# Patient Record
Sex: MALE | Race: ASIAN | NOT HISPANIC OR LATINO | Employment: UNEMPLOYED | URBAN - METROPOLITAN AREA
[De-identification: names, ages, dates, MRNs, and addresses within clinical notes are randomized per-mention and may not be internally consistent; named-entity substitution may affect disease eponyms.]

---

## 2020-03-28 ENCOUNTER — HOSPITAL ENCOUNTER (EMERGENCY)
Facility: HOSPITAL | Age: 55
Discharge: HOME | End: 2020-03-28
Attending: EMERGENCY MEDICINE
Payer: MEDICAID

## 2020-03-28 VITALS
WEIGHT: 140 LBS | HEART RATE: 68 BPM | DIASTOLIC BLOOD PRESSURE: 78 MMHG | TEMPERATURE: 99 F | BODY MASS INDEX: 22.5 KG/M2 | SYSTOLIC BLOOD PRESSURE: 120 MMHG | OXYGEN SATURATION: 98 % | HEIGHT: 66 IN | RESPIRATION RATE: 16 BRPM

## 2020-03-28 DIAGNOSIS — K59.00 CONSTIPATION: Primary | ICD-10-CM

## 2020-03-28 PROCEDURE — 99284 EMERGENCY DEPT VISIT MOD MDM: CPT | Mod: 25,ER

## 2020-03-28 RX ORDER — FAMOTIDINE 20 MG/1
20 TABLET, FILM COATED ORAL 2 TIMES DAILY
Qty: 60 TABLET | Refills: 0 | Status: SHIPPED | OUTPATIENT
Start: 2020-03-28 | End: 2021-03-28

## 2020-03-28 RX ORDER — SIMETHICONE 125 MG
125 CAPSULE ORAL 4 TIMES DAILY PRN
Qty: 30 CAPSULE | Refills: 0 | Status: SHIPPED | OUTPATIENT
Start: 2020-03-28

## 2020-03-28 RX ORDER — DICYCLOMINE HYDROCHLORIDE 20 MG/1
20 TABLET ORAL 2 TIMES DAILY
Qty: 20 TABLET | Refills: 0 | Status: SHIPPED | OUTPATIENT
Start: 2020-03-28 | End: 2020-04-27

## 2020-03-28 NOTE — ED PROVIDER NOTES
Encounter Date: 3/28/2020       History     Chief Complaint   Patient presents with    abdominal discomfort     pt presents to ED with c/o diffuse abd fullness that started x1 1/2 weeks ago, also decrease in appetite and weakness. Denies any coughing, fever, n/v/d, sob or previous testing for covid at this time     54 y.o. Male Israeli speaking, son translating, here for evaluation of  abd fullness. Notes he has over the last week felt bloated, took miralax recently which possibly helped but not sure. Denies f/c, n/v, diarrhea/dysuria or other complaints.        Review of patient's allergies indicates:  No Known Allergies  No past medical history on file.  No past surgical history on file.  Family History   Problem Relation Age of Onset    Colon cancer Brother      Social History     Tobacco Use    Smoking status: Smoker, Current Status Unknown     Packs/day: 1.00     Types: Cigarettes    Smokeless tobacco: Never Used   Substance Use Topics    Alcohol use: No     Frequency: Never    Drug use: No     Review of Systems   Constitutional: Negative for fever.   HENT: Negative for sore throat.    Respiratory: Negative for shortness of breath.    Cardiovascular: Negative for chest pain.   Gastrointestinal: Negative for abdominal distention, abdominal pain, nausea and vomiting.   Genitourinary: Negative for dysuria.   Musculoskeletal: Negative for back pain.   Skin: Negative for rash.   Neurological: Negative for weakness.   Hematological: Does not bruise/bleed easily.   All other systems reviewed and are negative.      Physical Exam     Initial Vitals [03/28/20 1746]   BP Pulse Resp Temp SpO2   120/78 68 16 98.6 °F (37 °C) 98 %      MAP       --         Physical Exam    Nursing note and vitals reviewed.  Constitutional: He appears well-developed and well-nourished.   HENT:   Head: Normocephalic and atraumatic.   Eyes: EOM are normal. Pupils are equal, round, and reactive to light.   Cardiovascular: Normal rate,  regular rhythm and normal heart sounds.   Pulmonary/Chest: Effort normal and breath sounds normal. No respiratory distress. He has no wheezes. He has no rales.   Abdominal: He exhibits no distension.   Musculoskeletal: He exhibits no edema or tenderness.   Neurological: He is alert and oriented to person, place, and time.   Skin: Skin is warm and dry.   Psychiatric: He has a normal mood and affect.     soft ntnd no g/r  No focal ttp,  ED Course   Procedures  Labs Reviewed - No data to display       Imaging Results    None                                  I have ordered xray. Will plan to start on simethicone, bentyl, pepcid  Will also refer to gi.      Clinical Impression:       ICD-10-CM ICD-9-CM   1. Constipation K59.00 564.00                                Fletcher Vigil MD  03/28/20 1695

## 2020-03-28 NOTE — DISCHARGE INSTRUCTIONS
Thank you for coming to our Emergency Department today. It is important to remember that some problems are difficult to diagnose and may not be found during your first visit. Be sure to follow up with your primary care doctor and review any labs/imaging that was performed with them. If you do not have a primary care doctor, you may contact the one listed on your discharge paperwork or you may also call the [facility] Appointment Desk at [phone] to schedule an appointment with one.     All medications may potentially have side effects and it is impossible to predict which medications may give you side effects. If you feel that you are having a negative effect of any medication you should immediately stop taking them and seek medical attention.    Return to the ER with any questions/concerns, new/concerning symptoms, worsening or failure to improve. Do not drive or make any important decisions for 24 hours if you have received any pain medications, sedatives or mood altering drugs during your ER visit.

## 2020-03-30 ENCOUNTER — HOSPITAL ENCOUNTER (EMERGENCY)
Facility: HOSPITAL | Age: 55
Discharge: HOME | End: 2020-03-30
Attending: EMERGENCY MEDICINE
Payer: MEDICAID

## 2020-03-30 VITALS
HEART RATE: 59 BPM | HEIGHT: 66 IN | OXYGEN SATURATION: 100 % | WEIGHT: 140 LBS | BODY MASS INDEX: 22.5 KG/M2 | DIASTOLIC BLOOD PRESSURE: 65 MMHG | SYSTOLIC BLOOD PRESSURE: 112 MMHG | TEMPERATURE: 98 F | RESPIRATION RATE: 19 BRPM

## 2020-03-30 DIAGNOSIS — E87.6 HYPOKALEMIA: ICD-10-CM

## 2020-03-30 DIAGNOSIS — R06.02 SOB (SHORTNESS OF BREATH): ICD-10-CM

## 2020-03-30 DIAGNOSIS — R10.84 GENERALIZED ABDOMINAL PAIN: Primary | ICD-10-CM

## 2020-03-30 DIAGNOSIS — R07.89 CHEST TIGHTNESS: ICD-10-CM

## 2020-03-30 LAB
ALBUMIN SERPL-MCNC: 3.6 G/DL (ref 3.3–5.5)
ALLENS TEST: ABNORMAL
ALP SERPL-CCNC: 50 U/L (ref 42–141)
BILIRUB SERPL-MCNC: 0.9 MG/DL (ref 0.2–1.6)
BUN SERPL-MCNC: 9 MG/DL (ref 7–22)
CALCIUM SERPL-MCNC: 9.2 MG/DL (ref 8–10.3)
CHLORIDE SERPL-SCNC: 107 MMOL/L (ref 98–108)
CREAT SERPL-MCNC: 0.9 MG/DL (ref 0.6–1.2)
GLUCOSE SERPL-MCNC: 140 MG/DL (ref 73–118)
HCO3 UR-SCNC: 25.1 MMOL/L (ref 24–28)
LDH SERPL L TO P-CCNC: 1.34 MMOL/L (ref 0.5–2.2)
PCO2 BLDA: 39.5 MMHG (ref 35–45)
PH SMN: 7.41 [PH] (ref 7.35–7.45)
PO2 BLDA: 62 MMHG (ref 40–60)
POC ALT (SGPT): 25 U/L (ref 10–47)
POC AST (SGOT): 25 U/L (ref 11–38)
POC B-TYPE NATRIURETIC PEPTIDE: 7.5 PG/ML (ref 0–100)
POC BE: 0 MMOL/L
POC CARDIAC TROPONIN I: 0 NG/ML
POC SATURATED O2: 92 % (ref 95–100)
POC TCO2: 26 MMOL/L (ref 24–29)
POC TCO2: 27 MMOL/L (ref 18–33)
POTASSIUM BLD-SCNC: 3.4 MMOL/L (ref 3.6–5.1)
PROTEIN, POC: 6.6 G/DL (ref 6.4–8.1)
SAMPLE: ABNORMAL
SAMPLE: NORMAL
SITE: ABNORMAL
SODIUM BLD-SCNC: 141 MMOL/L (ref 128–145)

## 2020-03-30 PROCEDURE — 25000003 PHARM REV CODE 250: Mod: ER | Performed by: EMERGENCY MEDICINE

## 2020-03-30 PROCEDURE — 81003 URINALYSIS AUTO W/O SCOPE: CPT | Mod: ER

## 2020-03-30 PROCEDURE — 85025 COMPLETE CBC W/AUTO DIFF WBC: CPT | Mod: ER

## 2020-03-30 PROCEDURE — 93005 ELECTROCARDIOGRAM TRACING: CPT | Mod: ER

## 2020-03-30 PROCEDURE — 96360 HYDRATION IV INFUSION INIT: CPT | Mod: ER

## 2020-03-30 PROCEDURE — 83880 ASSAY OF NATRIURETIC PEPTIDE: CPT | Mod: ER

## 2020-03-30 PROCEDURE — 93010 EKG 12-LEAD: ICD-10-PCS | Mod: ,,, | Performed by: INTERNAL MEDICINE

## 2020-03-30 PROCEDURE — 82803 BLOOD GASES ANY COMBINATION: CPT | Mod: ER

## 2020-03-30 PROCEDURE — 63600175 PHARM REV CODE 636 W HCPCS: Mod: ER | Performed by: EMERGENCY MEDICINE

## 2020-03-30 PROCEDURE — 93010 ELECTROCARDIOGRAM REPORT: CPT | Mod: ,,, | Performed by: INTERNAL MEDICINE

## 2020-03-30 PROCEDURE — 99285 EMERGENCY DEPT VISIT HI MDM: CPT | Mod: 25,ER

## 2020-03-30 PROCEDURE — 80053 COMPREHEN METABOLIC PANEL: CPT | Mod: ER

## 2020-03-30 PROCEDURE — 84484 ASSAY OF TROPONIN QUANT: CPT | Mod: ER

## 2020-03-30 PROCEDURE — 25500020 PHARM REV CODE 255: Mod: ER | Performed by: EMERGENCY MEDICINE

## 2020-03-30 RX ORDER — POTASSIUM CHLORIDE 20 MEQ/1
40 TABLET, EXTENDED RELEASE ORAL
Status: COMPLETED | OUTPATIENT
Start: 2020-03-30 | End: 2020-03-30

## 2020-03-30 RX ORDER — HYDROXYZINE PAMOATE 50 MG/1
50 CAPSULE ORAL NIGHTLY PRN
Qty: 15 CAPSULE | Refills: 0 | Status: SHIPPED | OUTPATIENT
Start: 2020-03-30

## 2020-03-30 RX ADMIN — SODIUM CHLORIDE 1000 ML: 0.9 INJECTION, SOLUTION INTRAVENOUS at 01:03

## 2020-03-30 RX ADMIN — IOHEXOL 75 ML: 350 INJECTION, SOLUTION INTRAVENOUS at 01:03

## 2020-03-30 RX ADMIN — LIDOCAINE HYDROCHLORIDE: 20 SOLUTION ORAL; TOPICAL at 02:03

## 2020-03-30 RX ADMIN — POTASSIUM CHLORIDE 40 MEQ: 1500 TABLET, EXTENDED RELEASE ORAL at 02:03

## 2020-03-30 NOTE — ED PROVIDER NOTES
"Encounter Date: 3/30/2020       History     Chief Complaint   Patient presents with    Chest Tightness     c/o chest tightness in the middle of chest since earlier today with intermittent SOB. denies n/v/d, dizziness or any other symptoms     54 y.o. Maltese male with tobacco abuse presents to ED with his son () who states patient presents to ED with acute chest tightness, chills, SOB and facial numbness that began yesterday around 5-6 AM while patient was asleep.  Son states patient was in ED yesterday for abdominal pain and was prescribed bentyl, pepcid and medication for constipation. Son reports giving the patient Pepcid and Bentyl but states he did not give additional medication for constipation because he had already tried that 3 days prior without improvement.  He states patient ate dinner last night and vomited once while he was eating.  Son reports patient with a scant bowel movement yesterday. Denies BRBPR or melena. Reports 5 lb weigh loss this week.     Patient also reports productive cough with white sputum in the mornings upon first waking x 1 week.     Reports patient's brother  of colon cancer in his 40's. Patient reports last c-scope 4 years ago had polyps removed overdue for follow up scope after 3 years.     Son also states patient has had fatigue, decreased appetite, and cold chills that start in both feet and radiates throughout the body and described as feeling as though "my body is scared" but denies feeling anxious.    The history is provided by a relative and the patient.     Review of patient's allergies indicates:  No Known Allergies  History reviewed. No pertinent past medical history.  History reviewed. No pertinent surgical history.  Family History   Problem Relation Age of Onset    Colon cancer Brother      Social History     Tobacco Use    Smoking status: Smoker, Current Status Unknown     Packs/day: 1.00     Types: Cigarettes    Smokeless tobacco: Never Used "   Substance Use Topics    Alcohol use: No     Frequency: Never    Drug use: No     Review of Systems   Constitutional: Positive for appetite change (decreased) and chills. Negative for fever.   Respiratory: Positive for shortness of breath. Negative for cough.    Cardiovascular: Positive for chest pain.   Gastrointestinal: Positive for abdominal pain and constipation. Negative for nausea and vomiting.   Genitourinary: Negative for decreased urine volume, dysuria and hematuria.   Musculoskeletal: Negative for arthralgias, back pain, gait problem and myalgias.   Psychiatric/Behavioral: Positive for sleep disturbance.   All other systems reviewed and are negative.      Physical Exam     Initial Vitals [03/30/20 0014]   BP Pulse Resp Temp SpO2   117/78 65 17 98.2 °F (36.8 °C) 99 %      MAP       --         Physical Exam    Nursing note and vitals reviewed.  Constitutional: He appears well-developed and well-nourished. He is not diaphoretic. No distress.   HENT:   Head: Normocephalic and atraumatic.   Mouth/Throat: Uvula is midline and oropharynx is clear and moist. Mucous membranes are dry.   Eyes: Conjunctivae and EOM are normal.   Neck: Normal range of motion and phonation normal. Neck supple. No stridor present.   Cardiovascular: Regular rhythm and intact distal pulses.   Pulmonary/Chest: No accessory muscle usage. No tachypnea. No respiratory distress.   Abdominal: He exhibits no distension. There is no tenderness. There is no rigidity, no rebound, no guarding, no CVA tenderness, no tenderness at McBurney's point and negative Mckeon's sign.   Musculoskeletal: Normal range of motion. He exhibits no tenderness.   Neurological: He is alert and oriented to person, place, and time.   Skin: Skin is warm and intact.   Psychiatric: He has a normal mood and affect.         ED Course   Procedures  Labs Reviewed   ISTAT PROCEDURE - Abnormal; Notable for the following components:       Result Value    POC PO2 62 (*)     POC  SATURATED O2 92 (*)     All other components within normal limits   POCT CMP - Abnormal; Notable for the following components:    POC Glucose 140 (*)     POC Potassium 3.4 (*)     All other components within normal limits   TROPONIN ISTAT   POCT CBC   POCT URINALYSIS W/O SCOPE   POCT CMP   POCT TROPONIN   POCT B-TYPE NATRIURETIC PEPTIDE (BNP)   POCT B-TYPE NATRIURETIC PEPTIDE (BNP)     EKG Readings: (Independently Interpreted)   Initial Reading: No STEMI. Rhythm: Normal Sinus Rhythm. Heart Rate: 57. Ectopy: No Ectopy. Conduction: Normal. ST Segments: Normal ST Segments. T Waves: Normal. T Waves Flipped: V1 and V2. Axis: Normal. Clinical Impression: Normal Sinus Rhythm       Imaging Results          CT Abdomen Pelvis With Contrast (Final result)  Result time 03/30/20 01:58:29    Final result by Joseph Cope MD (03/30/20 01:58:29)                 Impression:      No acute intra-abdominal abnormalities identified.    Additional incidental findings as detailed above.      Electronically signed by: Joseph Cope MD  Date:    03/30/2020  Time:    01:58             Narrative:    EXAMINATION:  CT ABDOMEN PELVIS WITH CONTRAST    CLINICAL HISTORY:  Abdominal distension;    TECHNIQUE:  Low dose axial images, sagittal and coronal reformations were obtained from the lung bases to the pubic symphysis following the IV administration of 75 mL of Omnipaque 350 .  Oral contrast was not given.    COMPARISON:  None.    FINDINGS:  The visualized portion of the heart is unremarkable.  The lung bases are clear.    Few small subcentimeter hypodensities are seen which are too small to characterize but may represent small cysts.  There is no intra-or extrahepatic biliary ductal dilatation.  Gallbladder is contracted.  The stomach, pancreas, spleen, and adrenal glands are unremarkable.    Kidneys, ureters, urinary bladder, and prostate show no significant abnormalities.    Appendix is visualized and is unremarkable.  The visualized  loops of small and large bowel show no evidence of obstruction or inflammation.  No free air or free fluid.    Aorta tapers normally.    No acute osseous abnormality identified.  Bilateral L5 pars defects are seen resulting in mild grade 1 anterolisthesis of L5 on S1.  Subcutaneous soft tissue show no significant abnormalities.                               X-Ray Chest AP Portable (Final result)  Result time 03/30/20 00:36:44    Final result by Joseph Cope MD (03/30/20 00:36:44)                 Impression:      No acute cardiopulmonary process identified.      Electronically signed by: Joseph Cope MD  Date:    03/30/2020  Time:    00:36             Narrative:    EXAMINATION:  XR CHEST AP PORTABLE    CLINICAL HISTORY:  sob;    TECHNIQUE:  Single frontal view of the chest was performed.    COMPARISON:  None    FINDINGS:  Cardiac silhouette is normal in size.  Lungs are symmetrically expanded.  No evidence of focal consolidative process, pneumothorax, or significant effusion.  No acute osseous abnormality identified.                                 Medical Decision Making:   Clinical Tests:   Lab Tests: Reviewed and Ordered  Radiological Study: Ordered and Reviewed  Medical Tests: Ordered and Reviewed  ED Management:  Abdominal pain resolved with GI cocktail.         Labs Reviewed  Admission on 03/30/2020   Component Date Value Ref Range Status    POC PH 03/30/2020 7.410  7.35 - 7.45 Final    POC PCO2 03/30/2020 39.5  35 - 45 mmHg Final    POC PO2 03/30/2020 62* 40 - 60 mmHg Final    POC HCO3 03/30/2020 25.1  24 - 28 mmol/L Final    POC BE 03/30/2020 0  -2 to 2 mmol/L Final    POC SATURATED O2 03/30/2020 92* 95 - 100 % Final    POC Lactate 03/30/2020 1.34  0.5 - 2.2 mmol/L Final    POC TCO2 03/30/2020 26  24 - 29 mmol/L Final    Sample 03/30/2020 VENOUS   Final    Site 03/30/2020 Other   Final    Allens Test 03/30/2020 N/A   Final    Albumin, POC 03/30/2020 3.6  3.3 - 5.5 g/dL Final    Alkaline  Phosphatase, POC 03/30/2020 50  42 - 141 U/L Final    ALT (SGPT), POC 03/30/2020 25  10 - 47 U/L Final    AST (SGOT), POC 03/30/2020 25  11 - 38 U/L Final    POC BUN 03/30/2020 9  7 - 22 mg/dL Final    Calcium, POC 03/30/2020 9.2  8 - 10.3 mg/dL Final    POC Chloride 03/30/2020 107  98 - 108 mmol/L Final    POC Creatinine 03/30/2020 0.9  0.6 - 1.2 mg/dL Final    POC Glucose 03/30/2020 140* 73 - 118 mg/dL Final    POC Potassium 03/30/2020 3.4* 3.6 - 5.1 mmol/L Final    POC Sodium 03/30/2020 141  128 - 145 mmol/L Final    Bilirubin 03/30/2020 0.9  0.2 - 1.6 mg/dL Final    POC TCO2 03/30/2020 27  18 - 33 mmol/L Final    Protein 03/30/2020 6.6  6.4 - 8.1 g/dL Final    POC Cardiac Troponin I 03/30/2020 0.00  <0.09 ng/mL Final    Sample 03/30/2020 unknown   Final    POC B-Type Natriuretic Peptide 03/30/2020 7.5  0 - 100 pg/mL Final        Imaging Reviewed    Imaging Results          CT Abdomen Pelvis With Contrast (Final result)  Result time 03/30/20 01:58:29    Final result by Joseph Cope MD (03/30/20 01:58:29)                 Impression:      No acute intra-abdominal abnormalities identified.    Additional incidental findings as detailed above.      Electronically signed by: Joseph Cope MD  Date:    03/30/2020  Time:    01:58             Narrative:    EXAMINATION:  CT ABDOMEN PELVIS WITH CONTRAST    CLINICAL HISTORY:  Abdominal distension;    TECHNIQUE:  Low dose axial images, sagittal and coronal reformations were obtained from the lung bases to the pubic symphysis following the IV administration of 75 mL of Omnipaque 350 .  Oral contrast was not given.    COMPARISON:  None.    FINDINGS:  The visualized portion of the heart is unremarkable.  The lung bases are clear.    Few small subcentimeter hypodensities are seen which are too small to characterize but may represent small cysts.  There is no intra-or extrahepatic biliary ductal dilatation.  Gallbladder is contracted.  The stomach, pancreas,  spleen, and adrenal glands are unremarkable.    Kidneys, ureters, urinary bladder, and prostate show no significant abnormalities.    Appendix is visualized and is unremarkable.  The visualized loops of small and large bowel show no evidence of obstruction or inflammation.  No free air or free fluid.    Aorta tapers normally.    No acute osseous abnormality identified.  Bilateral L5 pars defects are seen resulting in mild grade 1 anterolisthesis of L5 on S1.  Subcutaneous soft tissue show no significant abnormalities.                               X-Ray Chest AP Portable (Final result)  Result time 03/30/20 00:36:44    Final result by Joseph Cope MD (03/30/20 00:36:44)                 Impression:      No acute cardiopulmonary process identified.      Electronically signed by: Joseph Cope MD  Date:    03/30/2020  Time:    00:36             Narrative:    EXAMINATION:  XR CHEST AP PORTABLE    CLINICAL HISTORY:  sob;    TECHNIQUE:  Single frontal view of the chest was performed.    COMPARISON:  None    FINDINGS:  Cardiac silhouette is normal in size.  Lungs are symmetrically expanded.  No evidence of focal consolidative process, pneumothorax, or significant effusion.  No acute osseous abnormality identified.                                Medications given in ED    Medications   (pyxis) gi cocktail (mylanta 30 mL, lidocaine 2 % viscous 10 mL, dicyclomine 10 mL) 50 mL (has no administration in time range)   potassium chloride SA CR tablet 40 mEq (has no administration in time range)   sodium chloride 0.9% bolus 1,000 mL (1,000 mLs Intravenous New Bag 3/30/20 0153)   iohexoL (OMNIPAQUE 350) injection 100 mL (75 mLs Intravenous Given 3/30/20 0146)       Note was created using voice recognition software. Note may have occasional typographical errors that may not have been identified and edited despite good emi initial review prior to signing.                   ED Course as of Mar 30 0207   Mon Mar 30, 2020   0204  POC Cardiac Troponin I: 0.00 [DL]      ED Course User Index  [DL] Jessika Jesus MD                Clinical Impression:       ICD-10-CM ICD-9-CM   1. Generalized abdominal pain R10.84 789.07   2. SOB (shortness of breath) R06.02 786.05   3. Chest tightness R07.89 786.59   4. Hypokalemia E87.6 276.8             ED Disposition Condition    Discharge Stable        ED Prescriptions     Medication Sig Dispense Start Date End Date Auth. Provider    hydrOXYzine pamoate (VISTARIL) 50 MG Cap Take 1 capsule (50 mg total) by mouth nightly as needed (difficulty sleeping). 15 capsule 3/30/2020  Jessika Jesus MD        Follow-up Information     Follow up With Specialties Details Why Contact Info    Nico Gamino MD Family Medicine Call  Monday morning, to schedule an appointment, for re-evaluation of today's complaint, and ongoing care [address] [facility] [id number] [phone]                                       Jessika Jesus MD  03/30/20 0321

## 2021-03-22 ENCOUNTER — HOSPITAL ENCOUNTER (OUTPATIENT)
Dept: RADIOLOGY | Facility: HOSPITAL | Age: 56
Discharge: HOME | End: 2021-03-22
Attending: FAMILY MEDICINE
Payer: MEDICAID

## 2021-03-22 DIAGNOSIS — R19.00 ABDOMINAL MASS, UNSPECIFIED ABDOMINAL LOCATION: ICD-10-CM

## 2021-03-22 DIAGNOSIS — R10.9 ABDOMINAL PAIN, UNSPECIFIED ABDOMINAL LOCATION: ICD-10-CM

## 2021-03-22 PROCEDURE — 74177 CT ABD & PELVIS W/CONTRAST: CPT | Mod: 26,,, | Performed by: RADIOLOGY

## 2021-03-22 PROCEDURE — 25500020 PHARM REV CODE 255: Performed by: FAMILY MEDICINE

## 2021-03-22 PROCEDURE — 74177 CT ABD & PELVIS W/CONTRAST: CPT | Mod: TC

## 2021-03-22 PROCEDURE — 74177 CT ABDOMEN PELVIS WITH CONTRAST: ICD-10-PCS | Mod: 26,,, | Performed by: RADIOLOGY

## 2021-03-22 RX ADMIN — IOHEXOL 100 ML: 350 INJECTION, SOLUTION INTRAVENOUS at 02:03

## 2021-03-22 RX ADMIN — IOHEXOL 15 ML: 300 INJECTION, SOLUTION INTRAVENOUS at 02:03

## 2023-01-19 NOTE — DISCHARGE INSTRUCTIONS
Contact your doctor to schedule appointment with gastroenterology for today's complaint and screening colonoscopy.   Opt out